# Patient Record
Sex: MALE | Race: WHITE | NOT HISPANIC OR LATINO | Employment: FULL TIME | URBAN - METROPOLITAN AREA
[De-identification: names, ages, dates, MRNs, and addresses within clinical notes are randomized per-mention and may not be internally consistent; named-entity substitution may affect disease eponyms.]

---

## 2020-02-19 ENCOUNTER — LAB (OUTPATIENT)
Dept: LAB | Facility: HOSPITAL | Age: 35
End: 2020-02-19

## 2020-02-19 DIAGNOSIS — Z01.818 PRE-OP TESTING: Primary | ICD-10-CM

## 2020-02-19 LAB
ANION GAP SERPL CALCULATED.3IONS-SCNC: 10.3 MMOL/L (ref 5–15)
BUN BLD-MCNC: 12 MG/DL (ref 6–20)
BUN/CREAT SERPL: 14.1 (ref 7–25)
CALCIUM SPEC-SCNC: 9.5 MG/DL (ref 8.6–10.5)
CHLORIDE SERPL-SCNC: 100 MMOL/L (ref 98–107)
CO2 SERPL-SCNC: 31.7 MMOL/L (ref 22–29)
CREAT BLD-MCNC: 0.85 MG/DL (ref 0.76–1.27)
DEPRECATED RDW RBC AUTO: 42.3 FL (ref 37–54)
ERYTHROCYTE [DISTWIDTH] IN BLOOD BY AUTOMATED COUNT: 14.3 % (ref 12.3–15.4)
GFR SERPL CREATININE-BSD FRML MDRD: 103 ML/MIN/1.73
GLUCOSE BLD-MCNC: 81 MG/DL (ref 65–99)
HCT VFR BLD AUTO: 44.2 % (ref 37.5–51)
HGB BLD-MCNC: 14.7 G/DL (ref 13–17.7)
MCH RBC QN AUTO: 27.2 PG (ref 26.6–33)
MCHC RBC AUTO-ENTMCNC: 33.3 G/DL (ref 31.5–35.7)
MCV RBC AUTO: 81.9 FL (ref 79–97)
PLATELET # BLD AUTO: 280 10*3/MM3 (ref 140–450)
PMV BLD AUTO: 11 FL (ref 6–12)
POTASSIUM BLD-SCNC: 3.9 MMOL/L (ref 3.5–5.2)
RBC # BLD AUTO: 5.4 10*6/MM3 (ref 4.14–5.8)
SODIUM BLD-SCNC: 142 MMOL/L (ref 136–145)
WBC NRBC COR # BLD: 6.34 10*3/MM3 (ref 3.4–10.8)

## 2020-02-19 PROCEDURE — 36415 COLL VENOUS BLD VENIPUNCTURE: CPT

## 2020-02-19 PROCEDURE — 85027 COMPLETE CBC AUTOMATED: CPT

## 2020-02-19 PROCEDURE — 80048 BASIC METABOLIC PNL TOTAL CA: CPT

## 2020-02-24 ENCOUNTER — LAB REQUISITION (OUTPATIENT)
Dept: LAB | Facility: HOSPITAL | Age: 35
End: 2020-02-24

## 2020-02-24 DIAGNOSIS — R22.2 LOCALIZED SWELLING, MASS AND LUMP, TRUNK: ICD-10-CM

## 2020-02-24 PROCEDURE — 88304 TISSUE EXAM BY PATHOLOGIST: CPT | Performed by: SURGERY

## 2020-02-25 LAB
CYTO UR: NORMAL
LAB AP CASE REPORT: NORMAL
LAB AP CLINICAL INFORMATION: NORMAL
PATH REPORT.FINAL DX SPEC: NORMAL
PATH REPORT.GROSS SPEC: NORMAL

## 2023-09-06 ENCOUNTER — OFFICE VISIT (OUTPATIENT)
Dept: INTERNAL MEDICINE | Facility: CLINIC | Age: 38
End: 2023-09-06
Payer: COMMERCIAL

## 2023-09-06 ENCOUNTER — LAB (OUTPATIENT)
Dept: INTERNAL MEDICINE | Facility: CLINIC | Age: 38
End: 2023-09-06
Payer: COMMERCIAL

## 2023-09-06 VITALS
WEIGHT: 170.4 LBS | RESPIRATION RATE: 18 BRPM | SYSTOLIC BLOOD PRESSURE: 118 MMHG | BODY MASS INDEX: 25.24 KG/M2 | OXYGEN SATURATION: 98 % | DIASTOLIC BLOOD PRESSURE: 82 MMHG | HEIGHT: 69 IN | HEART RATE: 68 BPM | TEMPERATURE: 97.5 F

## 2023-09-06 DIAGNOSIS — F32.A ANXIETY AND DEPRESSION: ICD-10-CM

## 2023-09-06 DIAGNOSIS — Z00.01 ANNUAL VISIT FOR GENERAL ADULT MEDICAL EXAMINATION WITH ABNORMAL FINDINGS: ICD-10-CM

## 2023-09-06 DIAGNOSIS — F41.9 ANXIETY AND DEPRESSION: ICD-10-CM

## 2023-09-06 DIAGNOSIS — R53.83 FATIGUE, UNSPECIFIED TYPE: ICD-10-CM

## 2023-09-06 DIAGNOSIS — Z00.01 ANNUAL VISIT FOR GENERAL ADULT MEDICAL EXAMINATION WITH ABNORMAL FINDINGS: Primary | ICD-10-CM

## 2023-09-06 LAB
25(OH)D3 SERPL-MCNC: 38 NG/ML (ref 30–100)
DEPRECATED RDW RBC AUTO: 40.7 FL (ref 37–54)
ERYTHROCYTE [DISTWIDTH] IN BLOOD BY AUTOMATED COUNT: 13.4 % (ref 12.3–15.4)
HCT VFR BLD AUTO: 45.7 % (ref 37.5–51)
HGB BLD-MCNC: 15.1 G/DL (ref 13–17.7)
MCH RBC QN AUTO: 27.8 PG (ref 26.6–33)
MCHC RBC AUTO-ENTMCNC: 33 G/DL (ref 31.5–35.7)
MCV RBC AUTO: 84 FL (ref 79–97)
PLATELET # BLD AUTO: 271 10*3/MM3 (ref 140–450)
PMV BLD AUTO: 10.7 FL (ref 6–12)
RBC # BLD AUTO: 5.44 10*6/MM3 (ref 4.14–5.8)
VIT B12 BLD-MCNC: 464 PG/ML (ref 211–946)
WBC NRBC COR # BLD: 6.19 10*3/MM3 (ref 3.4–10.8)

## 2023-09-06 PROCEDURE — 82306 VITAMIN D 25 HYDROXY: CPT | Performed by: NURSE PRACTITIONER

## 2023-09-06 PROCEDURE — 80061 LIPID PANEL: CPT | Performed by: NURSE PRACTITIONER

## 2023-09-06 PROCEDURE — 82607 VITAMIN B-12: CPT | Performed by: NURSE PRACTITIONER

## 2023-09-06 PROCEDURE — 99385 PREV VISIT NEW AGE 18-39: CPT | Performed by: NURSE PRACTITIONER

## 2023-09-06 PROCEDURE — 83036 HEMOGLOBIN GLYCOSYLATED A1C: CPT | Performed by: NURSE PRACTITIONER

## 2023-09-06 PROCEDURE — 80050 GENERAL HEALTH PANEL: CPT | Performed by: NURSE PRACTITIONER

## 2023-09-06 PROCEDURE — 36415 COLL VENOUS BLD VENIPUNCTURE: CPT | Performed by: NURSE PRACTITIONER

## 2023-09-06 RX ORDER — CETIRIZINE HYDROCHLORIDE 10 MG/1
10 TABLET ORAL DAILY
COMMUNITY

## 2023-09-06 NOTE — PROGRESS NOTES
Patient Care Team:  Rosy Stuart APRN as PCP - General (Nurse Practitioner)  Emanuel Del Valle MD as Consulting Physician (Otolaryngology)     Chief Complaint   Patient presents with    [facility]    Annual Exam     Pt is not fasting             Patient is a 38 y.o. male who presents for his yearly physical exam.     HPI    He is here to establish care and would like an annual exam.     He reports he has not had lab drawn in several years. He advises he presents today secondary to a lack of energy. He reports taking daily vitamins in the past. He states he does exercise. He reports after exercising he feels like he is gasping for air. He denies any excessive hunger, thirst, or urination. He reports he becomes stressed a lot. He advises he is an . He reports over the summer, he noticed an increase in acne on his face. He believes it is secondary to stress at work. He reports he took Zoloft in the past. He acknowledges he is in a better mood when he exercises. He notes he does not get enough rest. He notes he does wake up through the night. He states in the last year, he intermittently wakes up gasping for air.     He notes he has a thyroid nodule. He follows with Emanuel Del Valle III, MD. He denies undergoing a biopsy. He states the nodule has not changed in size over the last 7 years. He advises he obtains a ultrasound annually.     He reports he does not hear well out of his left ear. He reports her obtained hearing tests and a MRI in the past. He notes it revealed a knot in his left forehead.  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 He reports he does eat fast food more than he would like. He notes he does eat a lot of sugar. He drink approximately 1 cup of alcohol a week. He denies any smoking.     He reports he presented to urology several times over years secondary to urine frequency.    He reports his hand and feet fall asleep faster than  normal.     Health maintenance/lifestyle:  Health Maintenance   Topic Date Due    BMI FOLLOWUP  Never done    HEPATITIS C SCREENING  Never done    ANNUAL PHYSICAL  Never done    COVID-19 Vaccine (5 - Pfizer series) 09/08/2023 (Originally 6/24/2021)    INFLUENZA VACCINE  10/01/2023    TDAP/TD VACCINES (2 - Td or Tdap) 01/01/2030    Pneumococcal Vaccine 0-64  Aged Out     Immunization History   Administered Date(s) Administered    COVID-19 (PFIZER) Purple Cap Monovalent 03/17/2021, 04/13/2021    Fluzone >6mos 10/27/2018, 09/30/2019    Tdap 01/01/2020     Cancer-related family history is not on file.   reports being sexually active and has had partner(s) who are female. He reports using the following method of birth control/protection: Vasectomy.  Social History     Tobacco Use   Smoking Status Never   Smokeless Tobacco Never     Social History     Substance and Sexual Activity   Alcohol Use Yes    Comment: socailly       Covid vaccine: refused booster  Influenza: out of date  Tetanus: up-to-date, Tdap 01/2020  Anxiety HELEN-7    Feeling nervous, anxious or on edge: More than half the days  Not being able to stop or control worrying: More than half the days  Worrying too much about different things: More than half the days  Trouble Relaxing: Nearly every day  Being so restless that it is hard to sit still: Several days  Becoming easily annoyed or irritable: Several days  Feeling afraid as if something awful might happen: Not at all  HELEN 7 Total Score: 11  If you checked any problems, how difficult have these problems made it for you to do your work, take care of things at home, or get along with other people: Somewhat difficult   D  PHQ-9 Depression Screening  Little interest or pleasure in doing things? 1-->several days   Feeling down, depressed, or hopeless? 1-->several days   Trouble falling or staying asleep, or sleeping too much? 2-->more than half the days   Feeling tired or having little energy? 2-->more than half  the days   Poor appetite or overeating? 0-->not at all   Feeling bad about yourself - or that you are a failure or have let yourself or your family down? 1-->several days   Trouble concentrating on things, such as reading the newspaper or watching television? 1-->several days   Moving or speaking so slowly that other people could have noticed? Or the opposite - being so fidgety or restless that you have been moving around a lot more than usual? 2-->more than half the days   Thoughts that you would be better off dead, or of hurting yourself in some way? 0-->not at all   PHQ-9 Total Score 10   If you checked off any problems, how difficult have these problems made it for you to do your work, take care of things at home, or get along with other people? somewhat difficult         Review of Systems   Constitutional:  Positive for fatigue. Negative for fever and unexpected weight loss.   Eyes:  Negative for blurred vision, double vision and visual disturbance.   Respiratory:  Negative for cough, shortness of breath and wheezing.    Cardiovascular:  Negative for chest pain, palpitations and leg swelling.   Gastrointestinal:  Negative for abdominal pain, constipation, diarrhea, nausea and vomiting.   Endocrine: Negative for polydipsia, polyphagia and polyuria.   Genitourinary:  Negative for difficulty urinating, frequency and urgency.   Musculoskeletal:  Negative for arthralgias and myalgias.   Skin:  Negative for color change and rash.   Neurological:  Negative for dizziness, weakness and headache.   Hematological:  Negative for adenopathy. Does not bruise/bleed easily.   Psychiatric/Behavioral:  Positive for depressed mood and stress. The patient is nervous/anxious.        History  Social History     Socioeconomic History    Marital status:    Tobacco Use    Smoking status: Never    Smokeless tobacco: Never   Vaping Use    Vaping Use: Never used   Substance and Sexual Activity    Alcohol use: Yes     Comment:  "socailly    Drug use: Never    Sexual activity: Yes     Partners: Female     Birth control/protection: Vasectomy     Past Medical History:   Diagnosis Date    Hearing loss     left, sees ENT    Thyroid nodule     Pt see Dr. solorzano ENT      Past Surgical History:   Procedure Laterality Date    CIRCUMCISION      CYST REMOVAL  2019    VASECTOMY  2022    WISDOM TOOTH EXTRACTION  2009      No Known Allergies   Family History   Problem Relation Age of Onset    Alcohol abuse Mother     Arthritis Mother     Asthma Mother     Hypertension Mother     Arthritis Father     Hypertension Father     Mental illness Maternal Grandmother     Aneurysm Paternal Grandfather        Current Outpatient Medications:     cetirizine (zyrTEC) 10 MG tablet, Take 1 tablet by mouth Daily., Disp: , Rfl:                   /82 (BP Location: Right arm, Patient Position: Sitting, Cuff Size: Adult)   Pulse 68   Temp 97.5 °F (36.4 °C) (Infrared)   Resp 18   Ht 175.3 cm (69\")   Wt 77.3 kg (170 lb 6.4 oz)   SpO2 98%   BMI 25.16 kg/m²       Physical Exam  Vitals and nursing note reviewed.   Constitutional:       General: He is not in acute distress.     Appearance: Normal appearance. He is well-developed. He is not diaphoretic.   HENT:      Head: Normocephalic and atraumatic.      Right Ear: External ear normal.      Left Ear: External ear normal.      Nose: Nose normal.   Eyes:      General: No scleral icterus.        Right eye: No discharge.         Left eye: No discharge.      Conjunctiva/sclera: Conjunctivae normal.      Pupils: Pupils are equal, round, and reactive to light.   Neck:      Thyroid: No thyromegaly.      Vascular: No JVD (no bruits).      Trachea: No tracheal deviation.   Cardiovascular:      Rate and Rhythm: Normal rate and regular rhythm.      Heart sounds: No murmur heard.    No friction rub. No gallop.   Pulmonary:      Effort: Pulmonary effort is normal. No respiratory distress.      Breath sounds: Normal breath sounds. " No wheezing or rales.   Chest:      Chest wall: No tenderness.   Abdominal:      General: Bowel sounds are normal. There is no distension.      Palpations: Abdomen is soft. There is no mass.      Tenderness: There is no abdominal tenderness. There is no guarding or rebound.      Hernia: No hernia is present.   Genitourinary:     Comments: deferred  Musculoskeletal:         General: No tenderness or deformity. Normal range of motion.      Cervical back: Normal range of motion and neck supple.   Lymphadenopathy:      Cervical: No cervical adenopathy.   Skin:     General: Skin is warm and dry.      Coloration: Skin is not pale.      Findings: No erythema or rash.   Neurological:      Mental Status: He is alert and oriented to person, place, and time.      Motor: No abnormal muscle tone.      Deep Tendon Reflexes: Reflexes are normal and symmetric. Reflexes normal.   Psychiatric:         Behavior: Behavior normal.         Thought Content: Thought content normal.         Judgment: Judgment normal.                 Diagnoses and all orders for this visit:    1. Annual visit for general adult medical examination with abnormal findings (Primary)  -     CBC (No Diff); Future  -     Comprehensive Metabolic Panel; Future  -     Lipid Panel; Future  -     TSH Rfx On Abnormal To Free T4; Future  -     Vitamin B12; Future  -     Hemoglobin A1c; Future  -     Vitamin D,25-Hydroxy; Future    2. Fatigue, unspecified type  -     CBC (No Diff); Future  -     Comprehensive Metabolic Panel; Future  -     Lipid Panel; Future  -     TSH Rfx On Abnormal To Free T4; Future  -     Vitamin B12; Future  -     Hemoglobin A1c; Future  -     Vitamin D,25-Hydroxy; Future    3. Anxiety and depression  -     CBC (No Diff); Future  -     Comprehensive Metabolic Panel; Future  -     Lipid Panel; Future  -     TSH Rfx On Abnormal To Free T4; Future  -     Vitamin B12; Future  -     Hemoglobin A1c; Future  -     Vitamin D,25-Hydroxy; Future        Annual  physical exam  - Will check labs as above. He declines COVID-19 vaccination today. He will get influenza vaccination at his pharmacy. Will check labs to evaluate if there are any contributing causes to his fatigue, anxiety, or depression. Potential for evaluation for sleep apnea if fatigue persists despite treatment of any abnormalities detected upon lab review. Increase improvement in lifestyle measures such as routine physical activity, adequate sleep, and healthy diet.     Labs  ordered as above- will notify of results and treat accordingly. If patient has not received results within one week via mychart or letter, they will notify my office  Immunizations and screenings:   Other preventative/screenings are up-to-date/addressed as noted in the HPI.  Counseling: The patient is advised to begin progressive daily aerobic exercise program, improve dietary compliance, and return for routine annual checkups  Follow up: Return in about 1 year (around 9/6/2024) for Annual, and need to collect labs today.  Plan of care discussed with pt. They verbalized understanding and agreement.     Electronically signed by : ELVIE Paul    9/6/2023   13:29 EDT          Transcribed from ambient dictation for ELVIE Paul by Willie Travis.  09/06/23   12:53 EDT    Patient or patient representative verbalized consent to the visit recording.  I have personally performed the services described in this document as transcribed by the above individual, and it is both accurate and complete.  ELVIE Paul  9/6/2023  13:30 EDT

## 2023-09-07 LAB
ALBUMIN SERPL-MCNC: 4.5 G/DL (ref 3.5–5.2)
ALBUMIN/GLOB SERPL: 1.7 G/DL
ALP SERPL-CCNC: 59 U/L (ref 39–117)
ALT SERPL W P-5'-P-CCNC: 12 U/L (ref 1–41)
ANION GAP SERPL CALCULATED.3IONS-SCNC: 8.2 MMOL/L (ref 5–15)
AST SERPL-CCNC: 16 U/L (ref 1–40)
BILIRUB SERPL-MCNC: 0.5 MG/DL (ref 0–1.2)
BUN SERPL-MCNC: 13 MG/DL (ref 6–20)
BUN/CREAT SERPL: 12.9 (ref 7–25)
CALCIUM SPEC-SCNC: 9 MG/DL (ref 8.6–10.5)
CHLORIDE SERPL-SCNC: 104 MMOL/L (ref 98–107)
CHOLEST SERPL-MCNC: 167 MG/DL (ref 0–200)
CO2 SERPL-SCNC: 27.8 MMOL/L (ref 22–29)
CREAT SERPL-MCNC: 1.01 MG/DL (ref 0.76–1.27)
EGFRCR SERPLBLD CKD-EPI 2021: 97.6 ML/MIN/1.73
GLOBULIN UR ELPH-MCNC: 2.7 GM/DL
GLUCOSE SERPL-MCNC: 86 MG/DL (ref 65–99)
HBA1C MFR BLD: 5.3 % (ref 4.8–5.6)
HDLC SERPL-MCNC: 65 MG/DL (ref 40–60)
LDLC SERPL CALC-MCNC: 93 MG/DL (ref 0–100)
LDLC/HDLC SERPL: 1.44 {RATIO}
POTASSIUM SERPL-SCNC: 4.2 MMOL/L (ref 3.5–5.2)
PROT SERPL-MCNC: 7.2 G/DL (ref 6–8.5)
SODIUM SERPL-SCNC: 140 MMOL/L (ref 136–145)
TRIGL SERPL-MCNC: 42 MG/DL (ref 0–150)
TSH SERPL DL<=0.05 MIU/L-ACNC: 0.93 UIU/ML (ref 0.27–4.2)
VLDLC SERPL-MCNC: 9 MG/DL (ref 5–40)

## 2024-12-11 ENCOUNTER — OFFICE VISIT (OUTPATIENT)
Dept: INTERNAL MEDICINE | Facility: CLINIC | Age: 39
End: 2024-12-11
Payer: COMMERCIAL

## 2024-12-11 VITALS
OXYGEN SATURATION: 98 % | TEMPERATURE: 98 F | SYSTOLIC BLOOD PRESSURE: 118 MMHG | HEART RATE: 68 BPM | HEIGHT: 69 IN | DIASTOLIC BLOOD PRESSURE: 74 MMHG | WEIGHT: 175.4 LBS | BODY MASS INDEX: 25.98 KG/M2

## 2024-12-11 DIAGNOSIS — S16.1XXA STRAIN OF NECK MUSCLE, INITIAL ENCOUNTER: Primary | ICD-10-CM

## 2024-12-11 PROCEDURE — 99213 OFFICE O/P EST LOW 20 MIN: CPT | Performed by: STUDENT IN AN ORGANIZED HEALTH CARE EDUCATION/TRAINING PROGRAM

## 2024-12-11 RX ORDER — CYCLOBENZAPRINE HCL 5 MG
5-10 TABLET ORAL 2 TIMES DAILY PRN
Qty: 30 TABLET | Refills: 1 | Status: SHIPPED | OUTPATIENT
Start: 2024-12-11

## 2024-12-11 NOTE — ASSESSMENT & PLAN NOTE
Acute, self-limited. Treat with Flexeril BID. Discussed side-effect of sleepiness/fatigue. Recommended gentle stretches, massage and heat. Encouraged regular physical activity and ergonomic set-up at work.

## 2024-12-11 NOTE — PROGRESS NOTES
Office Note     Name: Ismael Stein    : 1985     MRN: [id number]     Chief Complaint  Neck Pain (Started friday)    Subjective     History of Present Illness:  Ismael Stein is a 39 y.o. male who presents today for right-sided neck pain.     Neck pain started Friday  No trauma or new activity/exercise that caused it. Maybe slept wrong.   Occasionally, gets crick in back or upper neck.   Has been using NSAID which helps some.   Pain radiating into right posterior neck and head     Review of Systems:   Review of Systems    Past Medical History:   Past Medical History:   Diagnosis Date    Hearing loss     left, sees ENT    Thyroid nodule     Pt see Dr. solorzano ENT       Past Surgical History:   Past Surgical History:   Procedure Laterality Date    CIRCUMCISION      CYST REMOVAL      VASECTOMY      WISDOM TOOTH EXTRACTION         Family History:   Family History   Problem Relation Age of Onset    Alcohol abuse Mother     Arthritis Mother     Asthma Mother     Hypertension Mother     Arthritis Father     Hypertension Father     Mental illness Maternal Grandmother     Aneurysm Paternal Grandfather        Social History:   Social History     Socioeconomic History    Marital status:    Tobacco Use    Smoking status: Never    Smokeless tobacco: Never   Vaping Use    Vaping status: Never Used   Substance and Sexual Activity    Alcohol use: Yes     Comment: socailly    Drug use: Never    Sexual activity: Yes     Partners: Female     Birth control/protection: Vasectomy       Immunizations:   Immunization History   Administered Date(s) Administered    COVID-19 (PFIZER) Purple Cap Monovalent 2021, 2021    Fluzone (or Fluarix & Flulaval for VFC) >6mos 10/27/2018, 2019    Tdap 2020      Medications:     Current Outpatient Medications:     cetirizine (zyrTEC) 10 MG tablet, Take 1 tablet by mouth Daily., Disp: , Rfl:     cyclobenzaprine (FLEXERIL) 5 MG tablet, Take 1-2  "tablets by mouth 2 (Two) Times a Day As Needed for Muscle Spasms., Disp: 30 tablet, Rfl: 1    Allergies:   No Known Allergies    Objective     Vital Signs  /74   Pulse 68   Temp 98 °F (36.7 °C) (Infrared)   Ht 175.3 cm (69.02\")   Wt 79.6 kg (175 lb 6.4 oz)   SpO2 98%   BMI 25.89 kg/m²   Estimated body mass index is 25.89 kg/m² as calculated from the following:    Height as of this encounter: 175.3 cm (69.02\").    Weight as of this encounter: 79.6 kg (175 lb 6.4 oz).           Physical Exam  Neck:      Comments: FROM of neck with flexion, extension and lateral rotation.   Musculoskeletal:      Cervical back: No rigidity.      Comments: Tightness of lateral posterior neck muscles at insertion to base of skull.    Lymphadenopathy:      Cervical: No cervical adenopathy.          Procedures     Results:  No results found for this or any previous visit (from the past 24 hours).     Assessment and Plan     Assessment/Plan:  Diagnoses and all orders for this visit:    1. Strain of neck muscle, initial encounter (Primary)  Assessment & Plan:  Acute, self-limited. Treat with Flexeril BID. Discussed side-effect of sleepiness/fatigue. Recommended gentle stretches, massage and heat. Encouraged regular physical activity and ergonomic set-up at work.       Other orders  -     cyclobenzaprine (FLEXERIL) 5 MG tablet; Take 1-2 tablets by mouth 2 (Two) Times a Day As Needed for Muscle Spasms.  Dispense: 30 tablet; Refill: 1        Follow Up  No follow-ups on file.    Janelle Arambula MD   [facility]"

## 2025-02-14 ENCOUNTER — OFFICE VISIT (OUTPATIENT)
Dept: INTERNAL MEDICINE | Facility: CLINIC | Age: 40
End: 2025-02-14
Payer: COMMERCIAL

## 2025-02-14 ENCOUNTER — LAB (OUTPATIENT)
Dept: INTERNAL MEDICINE | Facility: CLINIC | Age: 40
End: 2025-02-14
Payer: COMMERCIAL

## 2025-02-14 VITALS
TEMPERATURE: 98.6 F | WEIGHT: 176.2 LBS | DIASTOLIC BLOOD PRESSURE: 78 MMHG | BODY MASS INDEX: 26.1 KG/M2 | HEIGHT: 69 IN | HEART RATE: 82 BPM | SYSTOLIC BLOOD PRESSURE: 122 MMHG | OXYGEN SATURATION: 98 %

## 2025-02-14 DIAGNOSIS — Z11.59 ENCOUNTER FOR HEPATITIS C SCREENING TEST FOR LOW RISK PATIENT: ICD-10-CM

## 2025-02-14 DIAGNOSIS — Z00.00 ANNUAL PHYSICAL EXAM: Primary | ICD-10-CM

## 2025-02-14 DIAGNOSIS — E04.2 MULTIPLE THYROID NODULES: ICD-10-CM

## 2025-02-14 DIAGNOSIS — R39.9 LOWER URINARY TRACT SYMPTOMS (LUTS): ICD-10-CM

## 2025-02-14 DIAGNOSIS — Z00.00 ANNUAL PHYSICAL EXAM: ICD-10-CM

## 2025-02-14 LAB
ANION GAP SERPL CALCULATED.3IONS-SCNC: 8 MMOL/L (ref 5–15)
BUN SERPL-MCNC: 14 MG/DL (ref 6–20)
BUN/CREAT SERPL: 14.9 (ref 7–25)
CALCIUM SPEC-SCNC: 9.3 MG/DL (ref 8.6–10.5)
CHLORIDE SERPL-SCNC: 103 MMOL/L (ref 98–107)
CHOLEST SERPL-MCNC: 163 MG/DL (ref 0–200)
CO2 SERPL-SCNC: 28 MMOL/L (ref 22–29)
CREAT SERPL-MCNC: 0.94 MG/DL (ref 0.76–1.27)
EGFRCR SERPLBLD CKD-EPI 2021: 105.8 ML/MIN/1.73
GLUCOSE SERPL-MCNC: 87 MG/DL (ref 65–99)
HBA1C MFR BLD: 4.9 % (ref 4.8–5.6)
HCV AB SER QL: NORMAL
HDLC SERPL-MCNC: 58 MG/DL (ref 40–60)
LDLC SERPL CALC-MCNC: 95 MG/DL (ref 0–100)
LDLC/HDLC SERPL: 1.64 {RATIO}
POTASSIUM SERPL-SCNC: 4.1 MMOL/L (ref 3.5–5.2)
PSA SERPL-MCNC: 0.53 NG/ML (ref 0–4)
SODIUM SERPL-SCNC: 139 MMOL/L (ref 136–145)
TRIGL SERPL-MCNC: 49 MG/DL (ref 0–150)
TSH SERPL DL<=0.05 MIU/L-ACNC: 1.04 UIU/ML (ref 0.27–4.2)
VLDLC SERPL-MCNC: 10 MG/DL (ref 5–40)

## 2025-02-14 PROCEDURE — 99214 OFFICE O/P EST MOD 30 MIN: CPT | Performed by: STUDENT IN AN ORGANIZED HEALTH CARE EDUCATION/TRAINING PROGRAM

## 2025-02-14 PROCEDURE — 83036 HEMOGLOBIN GLYCOSYLATED A1C: CPT | Performed by: STUDENT IN AN ORGANIZED HEALTH CARE EDUCATION/TRAINING PROGRAM

## 2025-02-14 PROCEDURE — 84443 ASSAY THYROID STIM HORMONE: CPT | Performed by: STUDENT IN AN ORGANIZED HEALTH CARE EDUCATION/TRAINING PROGRAM

## 2025-02-14 PROCEDURE — 80061 LIPID PANEL: CPT | Performed by: STUDENT IN AN ORGANIZED HEALTH CARE EDUCATION/TRAINING PROGRAM

## 2025-02-14 PROCEDURE — 86803 HEPATITIS C AB TEST: CPT | Performed by: STUDENT IN AN ORGANIZED HEALTH CARE EDUCATION/TRAINING PROGRAM

## 2025-02-14 PROCEDURE — 80048 BASIC METABOLIC PNL TOTAL CA: CPT | Performed by: STUDENT IN AN ORGANIZED HEALTH CARE EDUCATION/TRAINING PROGRAM

## 2025-02-14 PROCEDURE — 99385 PREV VISIT NEW AGE 18-39: CPT | Performed by: STUDENT IN AN ORGANIZED HEALTH CARE EDUCATION/TRAINING PROGRAM

## 2025-02-14 PROCEDURE — 84153 ASSAY OF PSA TOTAL: CPT | Performed by: STUDENT IN AN ORGANIZED HEALTH CARE EDUCATION/TRAINING PROGRAM

## 2025-02-14 RX ORDER — CREATINE 100 %
POWDER (GRAM) MISCELLANEOUS
COMMUNITY

## 2025-02-14 NOTE — PROGRESS NOTES
Office Note     Name: Ismael Stein    : 1985     MRN: [id number]     Chief Complaint  Annual Exam    Subjective     History of Present Illness:  History of Present Illness  The patient presents for a physical exam.    He reports an overall improvement in his health since his last visit. He has been maintaining a regular exercise regimen, running approximately one mile daily, and aims to attend the gym four times a week. He has been supplementing his diet with AG1 and creatine, taking one scoop daily, and plans to continue this on workout days. He has no history of kidney stones. He has declined the influenza vaccine during this visit.    He has two thyroid nodules on the left side, which are monitored annually via ultrasound by Dr. Del Valle, an ENT specialist at [facility]. He experiences some pressure when swallowing on the side of the nodules, but his doctor has not expressed concern about this. His last ultrasound was in 2024, and no significant changes were noted.    He also has a small bony outgrowth, which occasionally causes soreness. The size of the outgrowth appears to fluctuate, sometimes feeling larger and more tender, while at other times it is barely noticeable.    He has experienced hearing loss in his left ear for the past three years, which has remained stable. He does not use a hearing aid. An MRI revealed small spots, which are being monitored. He recalls a possible eardrum rupture in childhood and exposure to shotgun noise without ear protection.    He reports no issues with urination, blood in stools, or blood in urine. He has had occasional hemorrhoids. He underwent a colonoscopy five or six years ago due to frequent urination. He was treated for a prostate infection in the past. He continues to experience difficulty emptying his bladder, despite attempts to limit fluid intake before bed. He has had rectal exams at each visit. He underwent a vasectomy in . He suspects  caffeine may be contributing to his symptoms, as he becomes jittery after consuming it. He reports no urgency but notes that his symptoms worsen if he delays urination for 30 minutes. He also reports dribbling before and after urination. He has no history of recurrent UTIs, urologic, or kidney problems.    Supplemental Information  He had a lipoma removed from his lower back.    SOCIAL HISTORY  He has three children, aged 13, 11, and 4, all of whom are healthy.    FAMILY HISTORY  He reports no family history of thyroid cancer, colon cancer, or prostate cancer. He has a cousin with type 1 diabetes and type 2 diabetes.    MEDICATIONS  Current: Flexeril      Review of Systems:   Review of Systems    Past Medical History:   Past Medical History:   Diagnosis Date    Hearing loss     left, sees ENT    Thyroid nodule     Pt see Dr. solorzano ENT       Past Surgical History:   Past Surgical History:   Procedure Laterality Date    CIRCUMCISION      CYST REMOVAL  2019    VASECTOMY  2022    WISDOM TOOTH EXTRACTION  2009       Family History:   Family History   Problem Relation Age of Onset    Alcohol abuse Mother     Arthritis Mother     Asthma Mother     Hypertension Mother     Arthritis Father     Hypertension Father     Mental illness Maternal Grandmother     Aneurysm Paternal Grandfather        Social History:   Social History     Socioeconomic History    Marital status:    Tobacco Use    Smoking status: Never    Smokeless tobacco: Never   Vaping Use    Vaping status: Never Used   Substance and Sexual Activity    Alcohol use: Yes     Comment: socailly    Drug use: Never    Sexual activity: Yes     Partners: Female     Birth control/protection: Vasectomy       Immunizations:   Immunization History   Administered Date(s) Administered    COVID-19 (PFIZER) Purple Cap Monovalent 03/17/2021, 04/01/2021, 04/13/2021, 04/29/2021    Fluzone  >6mos 10/02/2014, 10/02/2015    Fluzone (or Fluarix & Flulaval for VFC) >6mos  "10/27/2018, 09/30/2019, 11/10/2023    Hep A, Unspecified 08/26/2009, 11/14/2011    Tdap 01/01/2020        Medications:     Current Outpatient Medications:     cetirizine (zyrTEC) 10 MG tablet, Take 1 tablet by mouth Daily., Disp: , Rfl:     Creatine powder, Use., Disp: , Rfl:     Multiple Vitamins-Minerals (MULTI ADULT GUMMIES PO), Take  by mouth., Disp: , Rfl:     cyclobenzaprine (FLEXERIL) 5 MG tablet, Take 1-2 tablets by mouth 2 (Two) Times a Day As Needed for Muscle Spasms. (Patient not taking: Reported on 2/14/2025), Disp: 30 tablet, Rfl: 1    Allergies:   No Known Allergies    Objective     Vital Signs  /78   Pulse 82   Temp 98.6 °F (37 °C) (Infrared)   Ht 175.3 cm (69.02\")   Wt 79.9 kg (176 lb 3.2 oz)   SpO2 98%   BMI 26.01 kg/m²   Estimated body mass index is 26.01 kg/m² as calculated from the following:    Height as of this encounter: 175.3 cm (69.02\").    Weight as of this encounter: 79.9 kg (176 lb 3.2 oz).           Physical Exam  Constitutional:       Appearance: Normal appearance.   HENT:      Head: Normocephalic and atraumatic.      Right Ear: Tympanic membrane normal.      Left Ear: Tympanic membrane normal.      Nose: Nose normal.      Mouth/Throat:      Mouth: Mucous membranes are moist.   Eyes:      Conjunctiva/sclera: Conjunctivae normal.      Pupils: Pupils are equal, round, and reactive to light.   Cardiovascular:      Rate and Rhythm: Normal rate and regular rhythm.      Pulses: Normal pulses.      Heart sounds: Normal heart sounds.   Pulmonary:      Effort: Pulmonary effort is normal.      Breath sounds: Normal breath sounds.   Abdominal:      General: Abdomen is flat.      Palpations: Abdomen is soft.   Musculoskeletal:      Cervical back: No tenderness.   Lymphadenopathy:      Cervical: No cervical adenopathy.   Skin:     General: Skin is warm.   Neurological:      General: No focal deficit present.      Mental Status: He is alert and oriented to person, place, and time. "   Psychiatric:         Mood and Affect: Mood normal.         Behavior: Behavior normal.         Thought Content: Thought content normal.        Physical Exam  Lungs were auscultated.    Procedures     Results:  No results found for this or any previous visit (from the past 24 hours).   Results  Laboratory Studies  Blood work from 2023 was normal. HDL cholesterol was slightly high.    Imaging  MRI showed little spots.        Assessment and Plan     Assessment/Plan:  Diagnoses and all orders for this visit:    1. Annual physical exam (Primary)  Assessment & Plan:  Eating well. Exercising 4-5 x a week. Mood is good. Sleep is good. Declines flu vaccine. UTD on all other age-appropriate vaccines and screenings. Discussed colon cancer screening @ 46 yo.     Orders:  -     Lipid Panel; Future  -     Hemoglobin A1c; Future  -     Basic metabolic panel; Future    2. Lower urinary tract symptoms (LUTS)  Assessment & Plan:  Symptoms progressive over the past several years. He has been seen by urology in the past who was concerned for possible benign enlargement. Symptoms include nocturia, incomplete emptying, frequent urination, dribbling at end of urination. Denies hx of recurrent UTI. No hx of abnormal  anatomy. We will send a referral to [facility] for reevaluation. PSA today. No family history of prostate cancer.    Orders:  -     PSA DIAGNOSTIC ONLY; Future    3. Encounter for hepatitis C screening test for low risk patient  Assessment & Plan:  Routine screen. Never done. No risk factors.    Orders:  -     Hepatitis C antibody; Future    4. Multiple thyroid nodules  Assessment & Plan:  Thyroid nodules followed by endo annually with labs and US. Recently seen in December 2024; all reportedly stable. He will try to obtain records for us and let us know if any significant changes.     Orders:  -     TSH Rfx On Abnormal To Free T4; Future        Follow Up  No follow-ups on file.    Patient or patient representative verbalized  consent for the use of Ambient Listening during the visit with  Janelle Arambula MD for chart documentation. 2/14/2025  15:08 EST    Janelle Arambula MD   [facility] [place] [facility]

## 2025-02-14 NOTE — ASSESSMENT & PLAN NOTE
Eating well. Exercising 4-5 x a week. Mood is good. Sleep is good. Declines flu vaccine. UTD on all other age-appropriate vaccines and screenings. Discussed colon cancer screening @ 46 yo.

## 2025-02-14 NOTE — ASSESSMENT & PLAN NOTE
Thyroid nodules followed by endo annually with labs and US. Recently seen in December 2024; all reportedly stable. He will try to obtain records for us and let us know if any significant changes.

## 2025-02-14 NOTE — ASSESSMENT & PLAN NOTE
Symptoms progressive over the past several years. He has been seen by urology in the past who was concerned for possible benign enlargement. Symptoms include nocturia, incomplete emptying, frequent urination, dribbling at end of urination. Denies hx of recurrent UTI. No hx of abnormal  anatomy. We will send a referral to [facility] for reevaluation. PSA today. No family history of prostate cancer.